# Patient Record
Sex: FEMALE | Race: WHITE | NOT HISPANIC OR LATINO | Employment: OTHER | URBAN - METROPOLITAN AREA
[De-identification: names, ages, dates, MRNs, and addresses within clinical notes are randomized per-mention and may not be internally consistent; named-entity substitution may affect disease eponyms.]

---

## 2016-12-21 LAB
ALBUMIN SERPL BCP-MCNC: NORMAL G/DL
ALBUMIN/GLOB SERPL: NORMAL {RATIO}
ALP SERPL-CCNC: 71 U/L
ALT SERPL-CCNC: 9 U/L
ANION GAP SERPL CALC-SCNC: NORMAL MMOL/L
AST SERPL-CCNC: 13 U/L
BILIRUB SERPL-MCNC: 0.6 MG/DL
BUN SERPL-MCNC: 12 MG/DL
CALCIUM SERPL-MCNC: 9.5 MG/DL
CHLORIDE SERPL-SCNC: 106 MMOL/L
CHOLEST SERPL-MCNC: 180 MG/DL
CO2 SERPL-SCNC: 21 MMOL/L
CREAT SERPL-MCNC: 0.86 MG/DL
GLOBULIN SER CALC-MCNC: NORMAL G/L
GLUCOSE SERPL-MCNC: 88 MG/DL
HDLC SERPL-MCNC: 41 MG/DL
LDLC SERPL CALC-MCNC: 118 MG/DL
POTASSIUM SERPL-SCNC: 3.9 MMOL/L
PROT SERPL-MCNC: NORMAL G/DL
SODIUM SERPL-SCNC: 137 MMOL/L
TRIGL SERPL-MCNC: 107 MG/DL
TSH SERPL DL<=0.005 MIU/L-ACNC: 0.85 M[IU]/L

## 2017-02-21 ENCOUNTER — PATIENT MESSAGE (OUTPATIENT)
Dept: MEDICAL GROUP | Age: 42
End: 2017-02-21

## 2017-09-26 ENCOUNTER — PATIENT MESSAGE (OUTPATIENT)
Dept: MEDICAL GROUP | Age: 42
End: 2017-09-26

## 2017-09-28 ENCOUNTER — TELEPHONE (OUTPATIENT)
Dept: MEDICAL GROUP | Age: 42
End: 2017-09-28

## 2017-09-28 DIAGNOSIS — F41.1 GAD (GENERALIZED ANXIETY DISORDER): ICD-10-CM

## 2017-09-28 RX ORDER — BUPROPION HYDROCHLORIDE 150 MG/1
150 TABLET, EXTENDED RELEASE ORAL 2 TIMES DAILY
Qty: 180 TAB | Refills: 0 | Status: SHIPPED | OUTPATIENT
Start: 2017-09-28 | End: 2017-10-17 | Stop reason: SDUPTHER

## 2017-09-28 NOTE — TELEPHONE ENCOUNTER
Patient is a former patient of Dr. Ji.  Refill Wellbutrin today for patient. Please inform patient that she needs to establish care with a new PCP to further management of her medication.    Kind Regards,  Maria Isabel Merrill M.D.

## 2017-09-28 NOTE — TELEPHONE ENCOUNTER
1. Caller Name: self                                         Call Back Number: [phone] (home)       Patient approves a detailed voicemail message: N\A    called pt notified of message below.

## 2017-10-18 ENCOUNTER — OFFICE VISIT (OUTPATIENT)
Dept: INTERNAL MEDICINE | Facility: MEDICAL CENTER | Age: 42
End: 2017-10-18
Payer: COMMERCIAL

## 2017-10-18 VITALS
OXYGEN SATURATION: 98 % | DIASTOLIC BLOOD PRESSURE: 80 MMHG | BODY MASS INDEX: 25.86 KG/M2 | HEIGHT: 64 IN | WEIGHT: 151.5 LBS | TEMPERATURE: 98.6 F | HEART RATE: 80 BPM | SYSTOLIC BLOOD PRESSURE: 122 MMHG

## 2017-10-18 DIAGNOSIS — F41.1 GAD (GENERALIZED ANXIETY DISORDER): ICD-10-CM

## 2017-10-18 DIAGNOSIS — Z12.39 BREAST CANCER SCREENING: ICD-10-CM

## 2017-10-18 DIAGNOSIS — Z00.00 HEALTHCARE MAINTENANCE: ICD-10-CM

## 2017-10-18 PROCEDURE — 99213 OFFICE O/P EST LOW 20 MIN: CPT | Mod: GE | Performed by: INTERNAL MEDICINE

## 2017-10-18 RX ORDER — BUPROPION HYDROCHLORIDE 150 MG/1
150 TABLET, EXTENDED RELEASE ORAL 2 TIMES DAILY
Qty: 60 TAB | Refills: 6 | Status: SHIPPED | OUTPATIENT
Start: 2017-10-18 | End: 2018-08-28 | Stop reason: SDUPTHER

## 2017-10-18 ASSESSMENT — PATIENT HEALTH QUESTIONNAIRE - PHQ9: CLINICAL INTERPRETATION OF PHQ2 SCORE: 0

## 2017-10-18 NOTE — PATIENT INSTRUCTIONS
Mammography  Mammography is an X-ray of the breasts to look for changes that are not normal. The X-ray image is called a mammogram. This procedure can screen for breast cancer, can detect cancer early, and can diagnose cancer.   LET YOUR CAREGIVER KNOW ABOUT:  · Breast implants.  · Previous breast disease, biopsy, or surgery.  · If you are breastfeeding.  · Medicines taken, including vitamins, herbs, eyedrops, over-the-counter medicines, and creams.  · Use of steroids (by mouth or creams).  · Possibility of pregnancy, if this applies.  RISKS AND COMPLICATIONS  · Exposure to radiation, but at very low levels.  · The results may be misinterpreted.  · The results may not be accurate.  · Mammography may lead to further tests.  · Mammography may not catch certain cancers.  BEFORE THE PROCEDURE  · Schedule your test about 7 days after your menstrual period. This is when your breasts are the least tender and have signs of hormone changes.  · If you have had a mammography done at a different facility in the past, get the mammogram X-rays or have them sent to your current exam facility in order to compare them.  · Wash your breasts and under your arms the day of the test.  · Do not wear deodorants, perfumes, or powders anywhere on your body.  · Wear clothes that you can change in and out of easily.  PROCEDURE  Relax as much as possible during the test. Any discomfort during the test will be very brief. The test should take less than 30 minutes. The following will happen:  · You will undress from the waist up and put on a gown.  · You will  front of the X-ray machine.  · Each breast will be placed between 2 plastic or glass plates. The plates will compress your breast for a few seconds.  · X-rays will be taken from different angles of the breast.  AFTER THE PROCEDURE  · The mammogram will be examined.  · Depending on the quality of the images, you may need to repeat certain parts of the test.  · Ask when your test  results will be ready. Make sure you get your test results.  · You may resume normal activities.     This information is not intended to replace advice given to you by your health care provider. Make sure you discuss any questions you have with your health care provider.     Document Released: 12/15/2001 Document Revised: 03/11/2013 Document Reviewed: 02/26/2016   Interactive Patient Education ©2016 [employer]

## 2017-10-18 NOTE — PROGRESS NOTES
Established Patient    Sweta presents today with the following:    CC: to establish with PCP    HPI: Ms Bautista is a 43 yo F with pmhx of RACHAEL, increase LFT from alcohol, Eczema, tobacco use, alcohol use and obesity came to clinic to establish with PCP    RACHAEL: stable, well controlled on medication, denies symptoms    Hx of increased LFT: from alcohol. Last labs showed wnl. No signs or symptoms for liver disease. Pt quit alcohol 9 yr ago.    Hx of obesity: pt lost Wt and now her BMI 26. Pt is eating healthy and do exercise    Hx of tobacco use: pt quit smoking since 2011. No desire to go back    Hx of alcohol use: pt is sober for the last 9 yrs. No desire to go back      Patient Active Problem List    Diagnosis Date Noted   • Healthcare maintenance 10/18/2017   • Breast cancer screening 10/18/2017   • Impaired fasting glucose 12/17/2015   • RACHAEL (generalized anxiety disorder) 09/04/2013   • History of tobacco use 10/06/2011   • History of alcohol abuse 02/09/2011     Past Medical History:   Diagnosis Date   • Eczema 4/13/2015   • Tobacco abuse 2/8/2011   • History of tobacco use 2/8/2011    Quit smoking 10/6/11   • Elevated LFTs 2008   • Alcohol withdrawal seizure (CMS-HCC)    • Anxiety    • Family planning    • History of alcohol abuse sober 2008     Past Surgical History:   Procedure Laterality Date   • CYST EXCISION      neck     Family History   Problem Relation Age of Onset   • Alcohol/Drug Mother    • Hypertension Father    • Cancer Maternal Grandmother    • Alcohol/Drug Maternal Grandmother    • Stroke Paternal Grandfather    • Diabetes Paternal Grandfather    • Cancer Paternal Aunt      breast ca dx 70s   • Genitourinary () Neg Hx      Social History     Social History   • Marital status: Legally      Spouse name: N/A   • Number of children: 0   • Years of education: N/A     Occupational History   • Trailer sales Unknown     Social History Main Topics   • Smoking status: Former Smoker     Packs/day:  "0.50     Years: 10.00     Types: Cigarettes     Quit date: 10/7/2011   • Smokeless tobacco: Never Used   • Alcohol use No      Comment: remote hx 1 pint vodka per day. Quit 12/1/08   • Drug use: No      Comment: No current or previous IVDU   • Sexual activity: Yes     Partners: Male     Other Topics Concern   • Not on file     Social History Narrative    Legally  from spouse.        Current Outpatient Prescriptions   Medication Sig Dispense Refill   • buPROPion SR (WELLBUTRIN-SR) 150 MG TABLET SR 12 HR sustained-release tablet Take 1 Tab by mouth 2 times a day. TWICE DAILY 60 Tab 6   • IBUPROFEN PO        No current facility-administered medications for this visit.        ROS: As per HPI. Additional pertinent symptoms as noted below.  Constitutional: Denies fevers, chills. Losing Wt  Eyes: Denies changes in vision, no eye pain  Ears/Nose/Throat/Mouth: Denies nasal congestion or sore throat   Cardiovascular: Denies chest pain or palpitations   Respiratory: Denies shortness of breath , Denies cough  Gastrointestinal/Hepatic: Denies abdominal pain, nausea, vomiting, diarrhea, constipation or GI bleeding   Genitourinary: Denies bladder dysfunction, dysuria or frequency  Musculoskeletal/Rheum: Denies  joint pain and swelling   Skin: Denies rash  Neurological: Denies headache, confusion, memory loss or focal weakness/parasthesias  Psychiatric: hx of RACHAEL        /80   Pulse 80   Temp 37 °C (98.6 °F)   Ht 1.613 m (5' 3.5\")   Wt 68.7 kg (151 lb 8 oz)   SpO2 98%   BMI 26.42 kg/m²     Physical Exam   Constitutional:  oriented to person, place, and time. No distress.   Eyes: Pupils are equal, round, and reactive to light. No scleral icterus.  Neck: Neck supple. No thyromegaly present.   Cardiovascular: Normal rate, regular rhythm and normal heart sounds.  Exam reveals no gallop and no friction rub.  No murmur heard.  Pulmonary/Chest: Breath sounds normal. Chest wall is not dull to percussion.   Abdomen: " soft, NT, not distended. No organomegaly  Musculoskeletal:   no edema.   Lymphadenopathy: no cervical adenopathy  Neurological: alert and oriented to person, place, and time.   Skin: No cyanosis. Nails show no clubbing.        Assessment and Plan    #RACHAEL (generalized anxiety disorder)  - Well-controlled.  - continue buPROPion SR     #Hx of increased LFT: from alcohol. Last labs showed wnl. No signs or symptoms for liver disease. Pt quit alcohol 9 yr ago.  -ordered CBC, CMP for annual f/u  -ctm    #Hx of obesity: pt lost Wt and now her BMI 26. Pt is eating healthy and do exercise  -encourage continue eating healthy food, exercise regularly and avoid unhealthy food   -CTM     #Healthcare maintenance  Flu - denied  TD- due 7/2021  TDaP - 7/22/2011  Pap - 12/2015, normal result, next due 12/2020  Mammogram - last one 12/2015 showed No gross evidence of malignancy. Ordered for this visit          Signed by: Munir Amaral M.D.

## 2017-11-13 ENCOUNTER — HOSPITAL ENCOUNTER (OUTPATIENT)
Dept: LAB | Facility: MEDICAL CENTER | Age: 42
End: 2017-11-13
Attending: INTERNAL MEDICINE
Payer: COMMERCIAL

## 2017-11-13 ENCOUNTER — HOSPITAL ENCOUNTER (OUTPATIENT)
Dept: RADIOLOGY | Facility: MEDICAL CENTER | Age: 42
End: 2017-11-13
Attending: INTERNAL MEDICINE
Payer: COMMERCIAL

## 2017-11-13 DIAGNOSIS — F41.1 GAD (GENERALIZED ANXIETY DISORDER): ICD-10-CM

## 2017-11-13 DIAGNOSIS — Z00.00 HEALTHCARE MAINTENANCE: ICD-10-CM

## 2017-11-13 LAB
ALBUMIN SERPL BCP-MCNC: 4 G/DL (ref 3.2–4.9)
ALBUMIN/GLOB SERPL: 1.3 G/DL
ALP SERPL-CCNC: 47 U/L (ref 30–99)
ALT SERPL-CCNC: 16 U/L (ref 2–50)
ANION GAP SERPL CALC-SCNC: 5 MMOL/L (ref 0–11.9)
AST SERPL-CCNC: 17 U/L (ref 12–45)
BASOPHILS # BLD AUTO: 0.7 % (ref 0–1.8)
BASOPHILS # BLD: 0.03 K/UL (ref 0–0.12)
BILIRUB SERPL-MCNC: 0.3 MG/DL (ref 0.1–1.5)
BUN SERPL-MCNC: 12 MG/DL (ref 8–22)
CALCIUM SERPL-MCNC: 9.4 MG/DL (ref 8.5–10.5)
CHLORIDE SERPL-SCNC: 108 MMOL/L (ref 96–112)
CO2 SERPL-SCNC: 26 MMOL/L (ref 20–33)
CREAT SERPL-MCNC: 0.75 MG/DL (ref 0.5–1.4)
EOSINOPHIL # BLD AUTO: 0.04 K/UL (ref 0–0.51)
EOSINOPHIL NFR BLD: 1 % (ref 0–6.9)
ERYTHROCYTE [DISTWIDTH] IN BLOOD BY AUTOMATED COUNT: 61.5 FL (ref 35.9–50)
GFR SERPL CREATININE-BSD FRML MDRD: >60 ML/MIN/1.73 M 2
GLOBULIN SER CALC-MCNC: 3 G/DL (ref 1.9–3.5)
GLUCOSE SERPL-MCNC: 84 MG/DL (ref 65–99)
HCT VFR BLD AUTO: 35.2 % (ref 37–47)
HGB BLD-MCNC: 11.5 G/DL (ref 12–16)
IMM GRANULOCYTES # BLD AUTO: 0.01 K/UL (ref 0–0.11)
IMM GRANULOCYTES NFR BLD AUTO: 0.2 % (ref 0–0.9)
LYMPHOCYTES # BLD AUTO: 2.12 K/UL (ref 1–4.8)
LYMPHOCYTES NFR BLD: 50.8 % (ref 22–41)
MCH RBC QN AUTO: 32.1 PG (ref 27–33)
MCHC RBC AUTO-ENTMCNC: 32.7 G/DL (ref 33.6–35)
MCV RBC AUTO: 98.3 FL (ref 81.4–97.8)
MONOCYTES # BLD AUTO: 0.28 K/UL (ref 0–0.85)
MONOCYTES NFR BLD AUTO: 6.7 % (ref 0–13.4)
NEUTROPHILS # BLD AUTO: 1.69 K/UL (ref 2–7.15)
NEUTROPHILS NFR BLD: 40.6 % (ref 44–72)
NRBC # BLD AUTO: 0.02 K/UL
NRBC BLD AUTO-RTO: 0.5 /100 WBC
PLATELET # BLD AUTO: 234 K/UL (ref 164–446)
PMV BLD AUTO: 9.5 FL (ref 9–12.9)
POTASSIUM SERPL-SCNC: 3.7 MMOL/L (ref 3.6–5.5)
PROT SERPL-MCNC: 7 G/DL (ref 6–8.2)
RBC # BLD AUTO: 3.58 M/UL (ref 4.2–5.4)
SODIUM SERPL-SCNC: 139 MMOL/L (ref 135–145)
WBC # BLD AUTO: 4.2 K/UL (ref 4.8–10.8)

## 2017-11-13 PROCEDURE — 85025 COMPLETE CBC W/AUTO DIFF WBC: CPT

## 2017-11-13 PROCEDURE — 36415 COLL VENOUS BLD VENIPUNCTURE: CPT

## 2017-11-13 PROCEDURE — 80053 COMPREHEN METABOLIC PANEL: CPT

## 2017-11-15 ENCOUNTER — TELEPHONE (OUTPATIENT)
Dept: INTERNAL MEDICINE | Facility: MEDICAL CENTER | Age: 42
End: 2017-11-15

## 2017-11-15 NOTE — TELEPHONE ENCOUNTER
Pt called and left  regarding her CBC, her WBC and Hb/Hct are low. Pt needs to be seen again at clinic for further assessment and further lab work up. Pt advised to call clinic to make an appt. Her CT chest 7/2008 showed mediastinal LN.natividad as well as severe hepatic steatosis/hepatomegaly    Thank you

## 2017-11-21 ENCOUNTER — HOSPITAL ENCOUNTER (OUTPATIENT)
Dept: RADIOLOGY | Facility: MEDICAL CENTER | Age: 42
End: 2017-11-21
Attending: INTERNAL MEDICINE
Payer: COMMERCIAL

## 2017-11-21 DIAGNOSIS — N63.20 LEFT BREAST MASS: ICD-10-CM

## 2017-11-21 PROCEDURE — G0279 TOMOSYNTHESIS, MAMMO: HCPCS

## 2017-11-21 PROCEDURE — 76642 ULTRASOUND BREAST LIMITED: CPT | Mod: LT

## 2017-12-04 ENCOUNTER — TELEPHONE (OUTPATIENT)
Dept: INTERNAL MEDICINE | Facility: MEDICAL CENTER | Age: 42
End: 2017-12-04

## 2017-12-04 NOTE — TELEPHONE ENCOUNTER
----- Message from NORMA Cruz sent at 2017  4:45 PM PST -----  Regarding: FW: Medication refill needed  Contact: [phone]      ----- Message -----  From: Sweta Bautista  Sent: 2017  11:03 AM  To: Vito Espinosa  Subject: RE: Medication refill needed                      Hello it looks like my doctor Munir Amaral I did fill my prescription,   however he did well wellbutrin and I usually do bupropion as it   is the generic. What can I do     Thanks!!     Sweta [phone]    The prescription I need is for the [place] [facility]  ----- Message -----  From: April Oliva  Sent: 2017 11:27 AM PST  To: Sweta Bautista  Subject: RE: Medication refill needed  Hello Sweta,    You can e-mail your provider through [employer] under ask a question and selecting your healthcare team.     Thank you,  April Brito Support      ----- Message -----     From: Sweta Bautista     Sent: 2017 11:06 AM PST       To: Patient Customer Service Request Mailing List  Subject: RE: Medication refill needed    Thank you so much! Can you send me their information I didn’t find an email address so that I could contact them if they don’t fill the prescription?  ----- Message -----  From: April Oliva  Sent: 2017 11:02 AM PST  To: Sweta Bautista  Subject: RE: Medication refill needed  Hello Sweta,     I have sent your refill request to your Primary Care Provider. If you have any more questions you can contact your doctors office at [phone].   Thank you,  April Brito Support      ----- Message -----     From: Sweta Bautista     Sent: 2017  7:22 AM PST       To: Patient Customer Service Request Mailing List  Subject: Medication refill needed    Topic: Bill/Statement            After Visit Summary              Sweta Bautista : 1975       10/18/2017 10:00 AM   [facility] [phone]                          Instructions  from Munir Amaral,  M.D.                 Your personalized instructions can be found at the end of this document.                           Hello there my PCP is Munir Amaral - I need t o have my prescription refilled can you help me reach him?     • buPROPion  MG Tb12 sustained-release tablet < 127>< 127>        Return in about 6 months     (around 4/18/2018) for follow up.

## 2017-12-05 NOTE — TELEPHONE ENCOUNTER
Called pharmacy pt not due till tomorrow has been getting the generic and still has refills on file pt notified

## 2018-08-28 DIAGNOSIS — F41.1 GAD (GENERALIZED ANXIETY DISORDER): ICD-10-CM

## 2018-08-28 NOTE — TELEPHONE ENCOUNTER
Was the patient seen in the last year in this department? Yes Pt last seen on: 10/18/2017 by Dr. Amaral Next appt on: none      Does patient have an active prescription for medications requested? No     Received Request Via: Pharmacy

## 2018-08-30 RX ORDER — BUPROPION HYDROCHLORIDE 150 MG/1
150 TABLET, EXTENDED RELEASE ORAL 2 TIMES DAILY
Qty: 60 TAB | Refills: 6 | Status: SHIPPED | OUTPATIENT
Start: 2018-08-30 | End: 2019-02-11 | Stop reason: SDUPTHER

## 2019-02-02 DIAGNOSIS — F41.1 GAD (GENERALIZED ANXIETY DISORDER): ICD-10-CM

## 2019-02-04 DIAGNOSIS — F41.1 GAD (GENERALIZED ANXIETY DISORDER): ICD-10-CM

## 2019-02-04 RX ORDER — BUPROPION HYDROCHLORIDE 150 MG/1
TABLET, EXTENDED RELEASE ORAL
Refills: 6 | OUTPATIENT
Start: 2019-02-04

## 2019-02-06 DIAGNOSIS — F41.1 GAD (GENERALIZED ANXIETY DISORDER): ICD-10-CM

## 2019-02-06 RX ORDER — BUPROPION HYDROCHLORIDE 150 MG/1
TABLET, EXTENDED RELEASE ORAL
Refills: 6 | OUTPATIENT
Start: 2019-02-06

## 2019-07-03 DIAGNOSIS — F41.1 GAD (GENERALIZED ANXIETY DISORDER): ICD-10-CM

## 2019-07-03 RX ORDER — BUPROPION HYDROCHLORIDE 150 MG/1
150 TABLET, EXTENDED RELEASE ORAL 2 TIMES DAILY
Qty: 60 TAB | Refills: 1 | Status: SHIPPED | OUTPATIENT
Start: 2019-07-03

## 2019-07-22 ENCOUNTER — PATIENT MESSAGE (OUTPATIENT)
Dept: MEDICAL GROUP | Age: 44
End: 2019-07-22

## 2019-07-22 NOTE — TELEPHONE ENCOUNTER
From: Sweta Bautista  To: Maria Isabel Merrill M.D.  Sent: 7/22/2019 2:00 PM PDT  Subject: Prescription Question    Doug Nicolas - I know this is long overdue but can I continue getting my prescription filled? It was last done with Dr Amaral

## 2019-07-22 NOTE — PATIENT COMMUNICATION
Please call to inform patient that I have not seen her over 3 years.  She already switched primary care physician to [facility] internal medicine.  She has Wellbutrin refilled by Dr. Wong, [facility] internal medicine attending on 7/3/2019.  So she still has enough medication refill.  Please advise patient to continue follow-up with her current primary care physician to manage her medication.  I cannot refill any medication unless she wants to reestablish with me and she needs to have an establish care appointment to discuss her treatment.    Maria Isabel Merrill M.D.

## 2019-11-06 ENCOUNTER — HOSPITAL ENCOUNTER (OUTPATIENT)
Dept: LAB | Facility: MEDICAL CENTER | Age: 44
End: 2019-11-06
Attending: PHYSICIAN ASSISTANT

## 2019-11-06 LAB
ALBUMIN SERPL BCP-MCNC: 4.3 G/DL (ref 3.2–4.9)
ALBUMIN/GLOB SERPL: 1.5 G/DL
ALP SERPL-CCNC: 52 U/L (ref 30–99)
ALT SERPL-CCNC: 16 U/L (ref 2–50)
ANION GAP SERPL CALC-SCNC: 7 MMOL/L (ref 0–11.9)
AST SERPL-CCNC: 19 U/L (ref 12–45)
BASOPHILS # BLD AUTO: 0.6 % (ref 0–1.8)
BASOPHILS # BLD: 0.03 K/UL (ref 0–0.12)
BILIRUB SERPL-MCNC: 0.5 MG/DL (ref 0.1–1.5)
BUN SERPL-MCNC: 13 MG/DL (ref 8–22)
CALCIUM SERPL-MCNC: 9.4 MG/DL (ref 8.5–10.5)
CHLORIDE SERPL-SCNC: 106 MMOL/L (ref 96–112)
CHOLEST SERPL-MCNC: 185 MG/DL (ref 100–199)
CO2 SERPL-SCNC: 26 MMOL/L (ref 20–33)
CREAT SERPL-MCNC: 0.74 MG/DL (ref 0.5–1.4)
EOSINOPHIL # BLD AUTO: 0.13 K/UL (ref 0–0.51)
EOSINOPHIL NFR BLD: 2.8 % (ref 0–6.9)
ERYTHROCYTE [DISTWIDTH] IN BLOOD BY AUTOMATED COUNT: 43.7 FL (ref 35.9–50)
GLOBULIN SER CALC-MCNC: 2.8 G/DL (ref 1.9–3.5)
GLUCOSE SERPL-MCNC: 84 MG/DL (ref 65–99)
HCT VFR BLD AUTO: 39.3 % (ref 37–47)
HDLC SERPL-MCNC: 52 MG/DL
HGB BLD-MCNC: 12.8 G/DL (ref 12–16)
HIV 1+2 AB+HIV1 P24 AG SERPL QL IA: NON REACTIVE
IMM GRANULOCYTES # BLD AUTO: 0.01 K/UL (ref 0–0.11)
IMM GRANULOCYTES NFR BLD AUTO: 0.2 % (ref 0–0.9)
LDLC SERPL CALC-MCNC: 112 MG/DL
LYMPHOCYTES # BLD AUTO: 1.9 K/UL (ref 1–4.8)
LYMPHOCYTES NFR BLD: 40.4 % (ref 22–41)
MCH RBC QN AUTO: 30.8 PG (ref 27–33)
MCHC RBC AUTO-ENTMCNC: 32.6 G/DL (ref 33.6–35)
MCV RBC AUTO: 94.7 FL (ref 81.4–97.8)
MONOCYTES # BLD AUTO: 0.34 K/UL (ref 0–0.85)
MONOCYTES NFR BLD AUTO: 7.2 % (ref 0–13.4)
NEUTROPHILS # BLD AUTO: 2.29 K/UL (ref 2–7.15)
NEUTROPHILS NFR BLD: 48.8 % (ref 44–72)
NRBC # BLD AUTO: 0 K/UL
NRBC BLD-RTO: 0 /100 WBC
PLATELET # BLD AUTO: 264 K/UL (ref 164–446)
PMV BLD AUTO: 9.4 FL (ref 9–12.9)
POTASSIUM SERPL-SCNC: 3.8 MMOL/L (ref 3.6–5.5)
PROT SERPL-MCNC: 7.1 G/DL (ref 6–8.2)
RBC # BLD AUTO: 4.15 M/UL (ref 4.2–5.4)
SODIUM SERPL-SCNC: 139 MMOL/L (ref 135–145)
TRIGL SERPL-MCNC: 106 MG/DL (ref 0–149)
TSH SERPL DL<=0.005 MIU/L-ACNC: 0.99 UIU/ML (ref 0.38–5.33)
WBC # BLD AUTO: 4.7 K/UL (ref 4.8–10.8)

## 2019-11-06 PROCEDURE — 80053 COMPREHEN METABOLIC PANEL: CPT

## 2019-11-06 PROCEDURE — 87389 HIV-1 AG W/HIV-1&-2 AB AG IA: CPT

## 2019-11-06 PROCEDURE — 85025 COMPLETE CBC W/AUTO DIFF WBC: CPT

## 2019-11-06 PROCEDURE — 36415 COLL VENOUS BLD VENIPUNCTURE: CPT

## 2019-11-06 PROCEDURE — 84443 ASSAY THYROID STIM HORMONE: CPT

## 2019-11-06 PROCEDURE — 80061 LIPID PANEL: CPT
